# Patient Record
(demographics unavailable — no encounter records)

---

## 2024-10-09 NOTE — DISCUSSION/SUMMARY
[de-identified] : Today I had a lengthy discussion with the patient regarding their right ankle Achilles tendon secondary reconstruction with debridement and repair; calcaneal exostectomy 3/5/2024.. I have addressed all the patient's concerns surrounding the pathology of their condition. Overall, I am very pleased with the patient's progress. Continue with conservative treatments as necessary. The patient would like surgery in her left ankle. I advised the patient to wait until her right ankle has fully recovered before we can start to evaluate the left ankle.    Plan:  1. Continue using an OTC ankle sleeve when necessary. 2. I recommend that the patient utilize ice, NSAIDS PRN, and heat.  3. Continue with physical therapy as needed. A new script was provided in the office today.  f/u in 4 months.    The patient understood and verbally agreed to the treatment plan. All of their questions were answered, and they were satisfied with the visit. The patient should call the office if they have any questions or experience worsening symptoms.

## 2024-10-09 NOTE — REASON FOR VISIT
[Follow-Up Visit] : a follow-up visit for [Ankle Pain] : ankle pain [FreeTextEntry2] : right ankle pain

## 2024-10-09 NOTE — HISTORY OF PRESENT ILLNESS
[FreeTextEntry1] : 10/09/2024: BENEDICT TAMEZ is a 45 year old female presenting to the office for a follow up evaluation s/p right ankle Achilles tendon secondary reconstruction with debridement and repair; calcaneal exostectomy, DOS: 3/5/2024. Her symptoms have improved since last visit. She states that she is able to jump and jog without difficulty. She now endorses pain in her left ankle. Pain is exacerbated after prolonged periods of high impact activities. The patient presents to the office in [facility] and ambulating without assistance.  07/08/2024: BENEDICT TAMEZ is a 44 year old female presenting to the office s/p right ankle Achilles tendon secondary reconstruction with debridement and repair; calcaneal exostectomy 3/5/2024. She reports that she is doing well, and was able to go to [place] without too much difficulty. However, she did note some swelling and pain after prolonged periods of walking. However, she did state that she was ambulating a lot more. Overall, she is satisfied with the surgery and is able to do more. The patient presents to the office in [facility] and ambulating without assistance.

## 2024-10-09 NOTE — PHYSICAL EXAM
[de-identified] : Right ankle Physical Examination:   General: Alert and oriented x3.  In no acute distress.  Pleasant in nature with a normal affect.  No apparent respiratory distress. Erythema, Warmth, Rubor: Negative Swelling: Negative  +cheloid present   ROM: 1. Dorsiflexion: 10 degrees 2. Plantarflexion: 40 degrees 3. 10 degrees of subtalar motion 4. Inversion: 20 degrees 5. Eversion: 20 degrees   Tenderness to Palpation: 1. Lateral Malleolus: Negative 2. Medial Malleolus: Negative 3. Proximal Fibular Pain: Negative 4. Heel Pain: Negative 5. Cuboid: Negative 6. Navicular: Negative 7. Tibiotalar Joint: Negative 8. Subtalar Joint: Negative 9. Posterior Recess: Negative   Tendon Pain: 1. Achilles: Negative 2. Peroneals: Negative 3. Posterior Tibialis: Negative 4. Tibialis Anterior: Negative   Ligament Pain: 1. ATFL: Negative 2. CFL: Negative 3. PTFL: Negative 4. Deltoid Ligaments: Negative 5. Lis Franc Ligament: Negative   Stability: 1. Anterior Drawer: Negative 2. Posterior Drawer: Negative   Strength: 5/5 TA/GS/EHL   Pulses: 2+ DP/PT Pulses   Neuro: Intact motor and sensory   Additional Test: 1. Calcaneal Squeeze Test: Negative 2. Syndesmosis Squeeze Test: Negative 3. Phoenix Test: Negative  [de-identified] : No new images performed today.

## 2024-10-09 NOTE — ADDENDUM
[FreeTextEntry1] : I, Raul Davey, acted solely as a scribe for Dr. Jose Raul Epps on this date 10/09/2024.   All medical record entries made by the Scribe were at my, Dr. Jose Raul Epps, direction and personally dictated by me on 10/09/2024. I have reviewed the chart and agree that the record accurately reflects my personal performance of the history, physical exam, assessment and plan. I have also personally directed, reviewed, and agreed with the chart.

## 2025-03-24 NOTE — ASSESSMENT
[FreeTextEntry1] : Health care maintenance: check blood work follow up with optometry/ophthalmology and dentist for routine exams when due scheduled to see dermatology  scheduled to see GYN up to date with mammogram  referred to Gastroenterology for colonoscopy  Flu vaccine administered, patient tolerated well   Mild coronary atherosclerosis and mild hyperlipidemia: likely would recommend statin, recommend she follow up with cardiology to discuss further check updated blood work c/w diet and exercise as tolerated

## 2025-03-24 NOTE — HEALTH RISK ASSESSMENT
[Never] : Never [Patient reported mammogram was normal] : Patient reported mammogram was normal [Patient reported PAP Smear was normal] : Patient reported PAP Smear was normal [Yes] : Yes [Monthly or less (1 pt)] : Monthly or less (1 point) [1 or 2 (0 pts)] : 1 or 2 (0 points) [Never (0 pts)] : Never (0 points) [0] : 2) Feeling down, depressed, or hopeless: Not at all (0) [PHQ-2 Negative - No further assessment needed] : PHQ-2 Negative - No further assessment needed [de-identified] : rare alcohol use  [Audit-CScore] : 1 [de-identified] : Exercise is limited due to left foot pain- walking [de-identified] : Diet is generally good  [TGQ6Dvmot] : 0 [MammogramDate] : 07/2024 [PapSmearDate] : 06/2024

## 2025-03-24 NOTE — HISTORY OF PRESENT ILLNESS
[FreeTextEntry1] : Annual physical  [de-identified] : 45 year old female presents for an annual physical. She has been experiencing skin changes and hair thinning. She likely has been experiencing perimenopausal changes. She is scheduled to see dermatology. She has made an appointment to see a new gynecologist. She otherwise feels well today.   h/o multinodular thyroid follows with endocrinology   had previous cardiology evaluation  had CT angiogram of her heart in August 2022- Agatston score 34, showed minimal proximal LAD and proximal RCA narrowing due to mixed and noncalcified plaques last blood work revealed mild hyperlipidemia  not on a statin found to have a 4 mm left upper lobe nodule on that CT scan- patient chose not to go for dedicated CT chest due to low risk (not a smoker)   follows with Dr. Epps, orthopedist, regarding her h/o right ankle Achilles tendon secondary reconstruction surgery and left foot pain   agrees for a flu vaccine no fever

## 2025-04-15 NOTE — REASON FOR VISIT
Physical Therapy Visit    Visit Type: Daily Treatment Note  Visit: 3  Referring Provider: Sheeba Gonzalez MD  Medical Diagnosis (from order): Diagnosis Information    Diagnosis  719.45 (ICD-9-CM) - M25.551 (ICD-10-CM) - Right hip pain         SUBJECTIVE                                                                                                               Patient states she still has done minimal home exercise plan but states she is fighting a cold and has a big flare up of arthritis in her body. States she is trying to stop drinking soda.   Functional Change: See above      OBJECTIVE                                                                                                                    Observation   Limited standing tolerance to couple minutes due to back pain               Ambulation / Gait  Forward flexed posture w/ ambulation w/o assistive device   Able to keep more upright posture with slight flexed posture w/ walker-more confident              Treatment     Therapeutic Exercise  NuStep level 3 for LE ROM/conditioning x 12 minutes  Hamstring and calf stretch on step bilateral 1x30s  Prayer st with blue ball seated   Reviewed home exercise plan:  - Supine Lower Trunk Rotation   - Supine Posterior Pelvic Tilt  - Supine March  - Seated Hamstring Stretch    Gait Training  Gait training with walker-lots of education on use, form, sizing, and using at home    Skilled input: verbal instruction/cues and posture correction    Writer verbally educated and received verbal consent for hand placement, positioning of patient, and techniques to be performed today from patient for therapist position for techniques as described above and how they are pertinent to the patient's plan of care.    Home Exercise Program  Access Code: [email or web address]  URL: [email or web address]  Date: 03/31/2023  Prepared by: Juarez Gaines     Exercises  - Supine Lower Trunk Rotation  - 1-2 x daily - 7 x weekly  - 2-3 sets - 10 reps - 5 hold  - Supine Posterior Pelvic Tilt  - 1-2 x daily - 7 x weekly - 2-3 sets - 10 reps - 5 hold  - Supine March  - 1-2 x daily - 7 x weekly - 2-3 sets - 10 reps  - Seated Hamstring Stretch  - 1-2 x daily - 7 x weekly - 2-3 sets - 30 hold   Heat, switch positions throughout the day      ASSESSMENT                                                                                                            Reviewed home exercise plan and educated patient to try and perform in her bed at home, focusing on engaging her core. Educated and performed patient on use of walker. Patient was able to ambulate more than 5 minutes at one time without any back pain compared to walking only 2 minutes before needing a break w/o an assistive device. Educated patient to bring her walker next visit so we can size properly. Patient requires slight flexed lumbar spine with walking due to history of scoliosis.   Education:   - Results of above outlined education: Verbalizes understanding       Therapy procedure time and total treatment time can be found documented on the Time Entry flowsheet     [Initial] : an initial consultation for

## 2025-04-18 NOTE — HISTORY OF PRESENT ILLNESS
[N] : Patient reports normal menses [Condoms] : uses condoms [Y] : Positive pregnancy history [Menarche Age: ____] : age at menarche was [unfilled] [No] : Patient does not have concerns regarding sex [Currently Active] : currently active [Men] : men [Mammogramdate] : 07/26/2024 [TextBox_19] : BR 2  [BreastSonogramDate] : 07/26/2024 [TextBox_25] : BR 2  [PapSmeardate] : 06/13/2024 [TextBox_31] : Negative  [HPVDate] : 06/13/2024 [TextBox_78] : Negative  [LMPDate] : 03/20/25 [PGHxTotal] : 2 [[facility]] : 2 [[facility] : 2 [FreeTextEntry1] : 03/20/25

## 2025-04-18 NOTE — END OF VISIT
[FreeTextEntry3] : "I, Libra Juares, personally scribed the services dictated to me by Dr. Vilma Johnson MD in this documentation on 04/15/2025"   "I, Dr. Vilma Johnson MD, personally performed the services described in this documentation on 04/15/2025 for the patient as scribed by Libra Juares in my presence. I have reviewed and verified that all the information is accurate and true."

## 2025-04-18 NOTE — HISTORY OF PRESENT ILLNESS
[N] : Patient reports normal menses [Condoms] : uses condoms [Y] : Positive pregnancy history [Menarche Age: ____] : age at menarche was [unfilled] [No] : Patient does not have concerns regarding sex [Currently Active] : currently active [Men] : men [Mammogramdate] : 07/26/2024 [TextBox_19] : BR 2  [BreastSonogramDate] : 07/26/2024 [TextBox_25] : BR 2  [PapSmeardate] : 06/13/2024 [HPVDate] : 06/13/2024 [TextBox_31] : Negative  [TextBox_78] : Negative  [LMPDate] : 03/20/25 [PGHxTotal] : 2 [facility] [[facility]] : 2 [FreeTextEntry1] : 03/20/25

## 2025-04-18 NOTE — REVIEW OF SYSTEMS
[Patient Intake Form Reviewed] : Patient intake form was reviewed [Negative] : Genitourinary [Fatigue] : fatigue [Night Sweats] : night sweats [Abn Vaginal bleeding] : abnormal vaginal bleeding [Genital Rash/Irritation] : genital rash/irritation [Sleep Disturbances] : sleep disturbances [Hot Flashes] : hot flashes

## 2025-04-18 NOTE — PLAN
[FreeTextEntry1] : Differential diagnosis of dysfunctional uterine bleeding discussed at length including structural, hormonal, and malignant causes.   1. Hormone panel was drawn today to assess for hormonal causes. We will review lab results at her next visit.  2. A pelvic ultrasound was ordered as well.  3. We also discussed the need for hysteroscopy with biopsy to R/O structural and malignant causes. The procedure was discussed in detail. She will premedicate with motrin as there is cramping associated with this procedure.  Two weeks after her above work up is completed, she will return to discuss all results and discuss a treatment plan. Pt will return for an in-office hysteroscopy procedure.   We also discussed her perimenopausal symptoms and hoe they are affecting her quality of life. Pt states she would like to try the NuvaRing again. She did not tolerate the generic brand. She will continue to use it for her perimenopausal symptoms. She was instructed to use continuously.  Otherwise, she will follow up annually and as needed

## 2025-05-01 NOTE — DISCUSSION/SUMMARY
[EKG obtained to assist in diagnosis and management of assessed problem(s)] : EKG obtained to assist in diagnosis and management of assessed problem(s) [FreeTextEntry1] : Pt is a 44 y/o F PMH elevated HLD with high HDL, BMI 31 She p/w atypical CP Will check transthoracic echocardiogram to evaluate left ventricular function and assess for any structural abnormalities  will perform ETT to assess patient's current cardiac reserve to incremental activity and check for provocable ECG changes.  Advised pt to go to the nearest ED if symptoms persist or worsen  VSS The described plan was discussed with the pt.  All questions and concerns were addressed to the best of my knowledge.

## 2025-05-01 NOTE — HISTORY OF PRESENT ILLNESS
[FreeTextEntry1] : Pt is a 44 y/o F PMH elevated HLD with high HDL, BMI 31 She will sporadically get CP which lasts seconds, not related with exertion, non-radiating.  She denies SOB, diaphoresis, palpitations, dizziness, syncope, LE edema, PND, orthopnea.   TTE 09/2018 EF 66%, mild TR TTE 07/2022 EF 55%, min MR/TR ETT 09/2018, no ischemic changes, 17 METS CCTA 08/2022 ca score = 34. LAD/RCA min plaque CUS 09/2018 no significant atherosclerosis  PCP Dr Gaspar Employed as a teacher Smoking status: never rare Etoh Current exercise: 3-4x/week Daily water intake: 100 oz Daily caffeine intake: 1 cup coffee OTC medications: none Family hx: mother afib, brother "? heart issues" Previous hospitalizations: none 2 children - no problems with pregnancies

## 2025-05-15 NOTE — HISTORY OF PRESENT ILLNESS
[de-identified] : Pt. presents for skin check; c/o few spots of concern;  brown spot on forehead also:  c/o scaling, dryness throughout scalp Severity:  mild   Modifying factors:  none Associated symptoms:  none Context:  no association with activity

## 2025-05-15 NOTE — PHYSICAL EXAM
[Full Body Skin Exam Performed] : performed [FreeTextEntry3] : Skin examination performed of the face, neck, trunk, arms, legs;  The patient is well, alert and oriented, pleasant and cooperative. Eyelids, conjunctivae, oral mucosa, digits and nails all normal.   No cervical adenopathy.  Normal findings include:  Scaling waxy stuck on papule; right temple Scaling patches located on scalp; diffuse Angiomas + lentigines and solar damage are present in sun exposed areas;  Multiple benign nevi were noted. Some nevi exhibited mildly atypical features, but none were suspicious for malignancy.   No lesions were suspicious for malignancy.

## 2025-06-03 NOTE — HISTORY OF PRESENT ILLNESS
[N] : Patient reports normal menses [Condoms] : uses condoms [Intravaginal Ring] : uses the intravaginal ring [Y] : Positive pregnancy history [Menarche Age: ____] : age at menarche was [unfilled] [No] : Patient does not have concerns regarding sex [Currently Active] : currently active [Men] : men [Mammogramdate] : 07/26/2024 [TextBox_19] : BR 2  [BreastSonogramDate] : 07/26/2024 [TextBox_25] : BR 2  [PapSmeardate] : 06/13/2024 [TextBox_31] : Negative  [HPVDate] : 06/13/2024 [TextBox_78] : Negative  [LMPDate] : 05/25/2025 [PGHxTotal] : 2 [[facility]] : 2 [[facility]] : 2 [FreeTextEntry1] : 05/25/2025

## 2025-06-03 NOTE — END OF VISIT
[FreeTextEntry3] : I, Irene Alexander solely acted as scribe for Dr. Vilma Johnson on 06/03/2025  All medical entries made by the Scribe were at my, Dr. Polk, direction and personally dictated by me on 06/03/2025 . I have reviewed the chart and agree that the record accurately reflects my personal performance of the history, physical exam, assessment and plan. I have also personally directed, reviewed, and agreed with the chart.

## 2025-06-03 NOTE — PLAN
[FreeTextEntry1] : Retroverted cavity. Normal LT ostia. Hard to fully visualize cavity due to menses.   If EMB shows endometrial polyp will need D & C. Will call with EMB results.  If not relieved with nuvaring, consider ablation plus or minus Mirena.   RTO in 2 months for nuvaring f/u or as needed.

## 2025-06-03 NOTE — HISTORY OF PRESENT ILLNESS
[N] : Patient reports normal menses [Condoms] : uses condoms [Intravaginal Ring] : uses the intravaginal ring [Y] : Positive pregnancy history [Menarche Age: ____] : age at menarche was [unfilled] [No] : Patient does not have concerns regarding sex [Currently Active] : currently active [Men] : men [Mammogramdate] : 07/26/2024 [TextBox_19] : BR 2  [BreastSonogramDate] : 07/26/2024 [TextBox_25] : BR 2  [PapSmeardate] : 06/13/2024 [TextBox_31] : Negative  [HPVDate] : 06/13/2024 [TextBox_78] : Negative  [LMPDate] : 05/25/2025 [PGHxTotal] : 2 [[facility]] : 2 [[facility]] : 2 [FreeTextEntry1] : 05/25/2025

## 2025-06-12 NOTE — DISCUSSION/SUMMARY
[FreeTextEntry1] : Assessment is dysfunctional bleeding most likely secondary to just starting NuvaRing to help control her symptoms.  I explained that as she was recently biopsied no need for another biopsy sonogram essentially normal explained to patient I would either continue using NuvaRing give her body a chance to adjust to it or remove the NuvaRing and go back to how she was and see if she continues to bleed or not she wants to keep it in at this time we will follow-up as needed.  Spent approximately 15 minutes discussing different treatment options

## 2025-06-12 NOTE — REASON FOR VISIT
[Follow-Up] : a follow-up evaluation of [FreeTextEntry2] : C/O HEAVY MENSES AND IRREGULAR DURATION. F/U ON SONO

## 2025-06-12 NOTE — HISTORY OF PRESENT ILLNESS
[Excessive Bleeding] : bleeding has been excessive [Irregular Duration] : has been irregular [Condoms] : uses condoms [Intravaginal Ring] : uses the intravaginal ring [Y] : Positive pregnancy history [Menarche Age: ____] : age at menarche was [unfilled] [No] : Patient does not have concerns regarding sex [Mammogramdate] : 7/26/24 [TextBox_19] : BR-2 [BreastSonogramDate] : 7/26/24 [TextBox_25] : BR-2 [PapSmeardate] : 6/13/24 [TextBox_31] : NEG [ChlamydiaDate] : 7/26/22 [TextBox_68] : NEG [TrichomonasDate] : 7/26/22  [TextBox_73] : NEG [HPVDate] : 6/13/24 [TextBox_78] : NEG [LMPDate] : 05/25/25 [PGHxTotal] : 2 [[facility]] : 2 [[facility]] : 2 [FreeTextEntry1] : 05/25/25